# Patient Record
Sex: MALE | Race: ASIAN | NOT HISPANIC OR LATINO | URBAN - METROPOLITAN AREA
[De-identification: names, ages, dates, MRNs, and addresses within clinical notes are randomized per-mention and may not be internally consistent; named-entity substitution may affect disease eponyms.]

---

## 2024-10-09 ENCOUNTER — OUTPATIENT (OUTPATIENT)
Dept: OUTPATIENT SERVICES | Facility: HOSPITAL | Age: 69
LOS: 1 days | End: 2024-10-09

## 2024-10-09 VITALS
TEMPERATURE: 98 F | HEIGHT: 69.5 IN | SYSTOLIC BLOOD PRESSURE: 113 MMHG | RESPIRATION RATE: 16 BRPM | HEART RATE: 75 BPM | OXYGEN SATURATION: 97 % | DIASTOLIC BLOOD PRESSURE: 75 MMHG | WEIGHT: 192.02 LBS

## 2024-10-09 DIAGNOSIS — E21.0 PRIMARY HYPERPARATHYROIDISM: ICD-10-CM

## 2024-10-09 DIAGNOSIS — Z98.890 OTHER SPECIFIED POSTPROCEDURAL STATES: Chronic | ICD-10-CM

## 2024-10-09 DIAGNOSIS — Z95.5 PRESENCE OF CORONARY ANGIOPLASTY IMPLANT AND GRAFT: Chronic | ICD-10-CM

## 2024-10-09 DIAGNOSIS — I25.10 ATHEROSCLEROTIC HEART DISEASE OF NATIVE CORONARY ARTERY WITHOUT ANGINA PECTORIS: ICD-10-CM

## 2024-10-09 DIAGNOSIS — Z98.890 OTHER SPECIFIED POSTPROCEDURAL STATES: ICD-10-CM

## 2024-10-09 NOTE — H&P PST ADULT - NSICDXPASTMEDICALHX_GEN_ALL_CORE_FT
PAST MEDICAL HISTORY:  CAD (coronary artery disease)     Fatty liver     HLD (hyperlipidemia)     Primary hyperparathyroidism

## 2024-10-09 NOTE — H&P PST ADULT - PROBLEM SELECTOR PLAN 1
Pt is scheduled for parathyroidectomy with parathyroid hormone assay on 10/18/24.  Verbal and written pre op instructions reviewed with patient and pt able to verbalize understanding.   Verbal and written instructions given with chlorhexidine wash, pt able to verbalize understanding via teach back method.

## 2024-10-09 NOTE — H&P PST ADULT - NSANTHOSAYNRD_GEN_A_CORE
No. ESA screening performed.  STOP BANG Legend: 0-2 = LOW Risk; 3-4 = INTERMEDIATE Risk; 5-8 = HIGH Risk

## 2024-10-09 NOTE — H&P PST ADULT - ENMT COMMENTS
primary hyperparathyroidism Denies loose teeth or dentures.  Mallampati Denies loose teeth or dentures.  Mallampati I

## 2024-10-18 ENCOUNTER — OUTPATIENT (OUTPATIENT)
Dept: OUTPATIENT SERVICES | Facility: HOSPITAL | Age: 69
LOS: 1 days | Discharge: HOME | End: 2024-10-18
Payer: MEDICARE

## 2024-10-18 ENCOUNTER — TRANSCRIPTION ENCOUNTER (OUTPATIENT)
Age: 69
End: 2024-10-18

## 2024-10-18 ENCOUNTER — APPOINTMENT (OUTPATIENT)
Dept: SURGERY | Facility: HOSPITAL | Age: 69
End: 2024-10-18
Payer: MEDICARE

## 2024-10-18 ENCOUNTER — RESULT REVIEW (OUTPATIENT)
Age: 69
End: 2024-10-18

## 2024-10-18 VITALS
RESPIRATION RATE: 18 BRPM | WEIGHT: 192.02 LBS | SYSTOLIC BLOOD PRESSURE: 148 MMHG | HEIGHT: 69.5 IN | TEMPERATURE: 98 F | HEART RATE: 72 BPM | DIASTOLIC BLOOD PRESSURE: 89 MMHG | OXYGEN SATURATION: 100 %

## 2024-10-18 VITALS
RESPIRATION RATE: 15 BRPM | OXYGEN SATURATION: 96 % | DIASTOLIC BLOOD PRESSURE: 95 MMHG | HEART RATE: 75 BPM | SYSTOLIC BLOOD PRESSURE: 122 MMHG

## 2024-10-18 DIAGNOSIS — Z95.5 PRESENCE OF CORONARY ANGIOPLASTY IMPLANT AND GRAFT: Chronic | ICD-10-CM

## 2024-10-18 DIAGNOSIS — Z98.890 OTHER SPECIFIED POSTPROCEDURAL STATES: Chronic | ICD-10-CM

## 2024-10-18 DIAGNOSIS — E21.0 PRIMARY HYPERPARATHYROIDISM: ICD-10-CM

## 2024-10-18 LAB
PTH INTACT, INTRAOP SPECIMEN 2: SIGNIFICANT CHANGE UP
PTH INTACT, INTRAOP SPECIMEN 3: SIGNIFICANT CHANGE UP
PTH INTACT, INTRAOP SPECIMEN 4: SIGNIFICANT CHANGE UP
PTH INTACT, INTRAOP TIMING 2: SIGNIFICANT CHANGE UP
PTH INTACT, INTRAOP TIMING 3: SIGNIFICANT CHANGE UP
PTH INTACT, INTRAOP TIMING 4: SIGNIFICANT CHANGE UP
PTH INTACT, INTRAOPERATIVE 2: 91 PG/ML — HIGH (ref 15–65)
PTH INTACT, INTRAOPERATIVE 3: 37 PG/ML — SIGNIFICANT CHANGE UP (ref 15–65)
PTH INTACT, INTRAOPERATIVE 4: 29 PG/ML — SIGNIFICANT CHANGE UP (ref 15–65)
PTH-INTACT IO % DIF SERPL: 90 PG/ML — HIGH (ref 15–65)

## 2024-10-18 PROCEDURE — 88331 PATH CONSLTJ SURG 1 BLK 1SPC: CPT | Mod: 26

## 2024-10-18 PROCEDURE — 88305 TISSUE EXAM BY PATHOLOGIST: CPT | Mod: 26

## 2024-10-18 PROCEDURE — 60500 EXPLORE PARATHYROID GLANDS: CPT

## 2024-10-18 DEVICE — LIGATING CLIPS WECK HORIZON SMALL-WIDE (RED) 24: Type: IMPLANTABLE DEVICE | Status: FUNCTIONAL

## 2024-10-18 RX ORDER — MULTI VITAMIN/MINERAL SUPPLEMENT WITH ASCORBIC ACID, NIACIN, PYRIDOXINE, PANTOTHENIC ACID, FOLIC ACID, RIBOFLAVIN, THIAMIN, BIOTIN, COBALAMIN AND ZINC. 60; 20; 12.5; 10; 10; 1.7; 1.5; 1; .3; .006 MG/1; MG/1; MG/1; MG/1; MG/1; MG/1; MG/1; MG/1; MG/1; MG/1
1 TABLET, COATED ORAL
Refills: 0 | DISCHARGE

## 2024-10-18 RX ORDER — ANTACID TABLETS 500 MG/1
1 TABLET, CHEWABLE ORAL
Qty: 0 | Refills: 0 | DISCHARGE
Start: 2024-10-18

## 2024-10-18 RX ORDER — ONDANSETRON HCL/PF 4 MG/2 ML
4 VIAL (ML) INJECTION ONCE
Refills: 0 | Status: DISCONTINUED | OUTPATIENT
Start: 2024-10-18 | End: 2024-10-21

## 2024-10-18 RX ORDER — LIFITEGRAST 50 MG/ML
1 SOLUTION/ DROPS OPHTHALMIC
Refills: 0 | DISCHARGE

## 2024-10-18 RX ORDER — ATORVASTATIN CALCIUM 10 MG/1
1 TABLET, FILM COATED ORAL
Refills: 0 | DISCHARGE

## 2024-10-18 RX ORDER — BENZOCAINE AND LEVOMENTHOL 200; 5 MG/G; MG/G
1 SPRAY TOPICAL
Refills: 0 | Status: DISCONTINUED | OUTPATIENT
Start: 2024-10-18 | End: 2024-10-21

## 2024-10-18 RX ORDER — ASPIRIN 325 MG
1 TABLET ORAL
Refills: 0 | DISCHARGE

## 2024-10-18 RX ORDER — OXYCODONE HYDROCHLORIDE 30 MG/1
5 TABLET, FILM COATED, EXTENDED RELEASE ORAL ONCE
Refills: 0 | Status: DISCONTINUED | OUTPATIENT
Start: 2024-10-18 | End: 2024-10-21

## 2024-10-18 RX ORDER — ACETAMINOPHEN 325 MG
1000 TABLET ORAL EVERY 6 HOURS
Refills: 0 | Status: DISCONTINUED | OUTPATIENT
Start: 2024-10-18 | End: 2024-10-21

## 2024-10-18 RX ORDER — ANTACID TABLETS 500 MG/1
1 TABLET, CHEWABLE ORAL EVERY 6 HOURS
Refills: 0 | Status: DISCONTINUED | OUTPATIENT
Start: 2024-10-18 | End: 2024-10-21

## 2024-10-18 RX ORDER — SODIUM CHLORIDE IRRIG SOLUTION 0.9 %
1000 SOLUTION, IRRIGATION IRRIGATION
Refills: 0 | Status: DISCONTINUED | OUTPATIENT
Start: 2024-10-18 | End: 2024-10-21

## 2024-10-18 RX ORDER — FENTANYL CITRATE-0.9 % NACL/PF 300MCG/30
25 PATIENT CONTROLLED ANALGESIA VIAL INJECTION
Refills: 0 | Status: DISCONTINUED | OUTPATIENT
Start: 2024-10-18 | End: 2024-10-21

## 2024-10-18 RX ORDER — ACETAMINOPHEN 325 MG
2 TABLET ORAL
Qty: 0 | Refills: 0 | DISCHARGE
Start: 2024-10-18

## 2024-10-18 RX ADMIN — ANTACID TABLETS 1 TABLET(S): 500 TABLET, CHEWABLE ORAL at 14:12

## 2024-10-18 RX ADMIN — BENZOCAINE AND LEVOMENTHOL 1 LOZENGE: 200; 5 SPRAY TOPICAL at 14:12

## 2024-10-18 NOTE — ASU DISCHARGE PLAN (ADULT/PEDIATRIC) - FINANCIAL ASSISTANCE
[facility] provides services at a reduced cost to those who are determined to be eligible through [facility]’s financial assistance program. Information regarding [facility]’s financial assistance program can be found by going to [email or web address] or by calling [phone].

## 2024-10-18 NOTE — ASU DISCHARGE PLAN (ADULT/PEDIATRIC) - CARE PROVIDER_API CALL
Rashad Durham  Surgery  [address] [place] [address] [address]  [place] [address]  Phone: [phone]  Fax: [phone]  Follow Up Time:

## 2024-10-18 NOTE — BRIEF OPERATIVE NOTE - OPERATION/FINDINGS
skin incise with scalpel and tissue dissected with electrocautery. platysma and fascia divided. pth release assay performed at start of case, pre-excision, post-excision at intervals. right superior parathyroid gland was removed and found to be hypercellular by pathology. blood vessels clipped. platysma and fascia closed with chromic suture. skin closed with monocryl and dressed with steri strips.

## 2024-10-18 NOTE — ASU DISCHARGE PLAN (ADULT/PEDIATRIC) - NS MD DC FALL RISK RISK
For information on Fall & Injury Prevention, visit: [email or web address] OR  [email or web address] OR  https://www.cdc.gov/steadi/patient.html

## 2024-10-18 NOTE — ASU DISCHARGE PLAN (ADULT/PEDIATRIC) - NURSING INSTRUCTIONS
Last dose of TYLENOL for pain management was at 12PM. Next dose of TYLENOL may be taken at or after 6PM if needed. DO NOT take any additional products containing TYLENOL or ACETAMINOPHEN, such as VICODIN, PERCOCET, NORCO, EXCEDRIN, and any over-the-counter cold medications. DO NOT CONSUME MORE THAN 9922-9246 MG OF TYLENOL (acetaminophen) in a 24-hour period.

## 2024-10-23 LAB — SURGICAL PATHOLOGY STUDY: SIGNIFICANT CHANGE UP

## 2024-10-29 ENCOUNTER — APPOINTMENT (OUTPATIENT)
Dept: SURGERY | Facility: CLINIC | Age: 69
End: 2024-10-29
Payer: MEDICARE

## 2024-10-29 DIAGNOSIS — E21.3 HYPERPARATHYROIDISM, UNSPECIFIED: ICD-10-CM

## 2024-10-29 PROBLEM — K76.0 FATTY (CHANGE OF) LIVER, NOT ELSEWHERE CLASSIFIED: Chronic | Status: ACTIVE | Noted: 2024-10-09

## 2024-10-29 PROBLEM — E21.0 PRIMARY HYPERPARATHYROIDISM: Chronic | Status: ACTIVE | Noted: 2024-10-09

## 2024-10-29 PROBLEM — I25.10 ATHEROSCLEROTIC HEART DISEASE OF NATIVE CORONARY ARTERY WITHOUT ANGINA PECTORIS: Chronic | Status: ACTIVE | Noted: 2024-10-09

## 2024-10-29 PROBLEM — E78.5 HYPERLIPIDEMIA, UNSPECIFIED: Chronic | Status: ACTIVE | Noted: 2024-10-09

## 2024-10-29 PROCEDURE — 36415 COLL VENOUS BLD VENIPUNCTURE: CPT

## 2024-10-29 PROCEDURE — 99024 POSTOP FOLLOW-UP VISIT: CPT

## 2024-10-30 ENCOUNTER — NON-APPOINTMENT (OUTPATIENT)
Age: 69
End: 2024-10-30

## 2024-10-30 LAB
25(OH)D3 SERPL-MCNC: 42.3 NG/ML
CALCIUM SERPL-MCNC: 9.6 MG/DL
CALCIUM SERPL-MCNC: 9.6 MG/DL
PARATHYROID HORMONE INTACT: 22 PG/ML

## 2025-02-04 ENCOUNTER — APPOINTMENT (OUTPATIENT)
Dept: SURGERY | Facility: CLINIC | Age: 70
End: 2025-02-04
Payer: MEDICARE

## 2025-02-04 DIAGNOSIS — E21.3 HYPERPARATHYROIDISM, UNSPECIFIED: ICD-10-CM

## 2025-02-04 PROCEDURE — 99213 OFFICE O/P EST LOW 20 MIN: CPT

## 2025-02-04 PROCEDURE — 36415 COLL VENOUS BLD VENIPUNCTURE: CPT

## 2025-02-05 ENCOUNTER — NON-APPOINTMENT (OUTPATIENT)
Age: 70
End: 2025-02-05

## 2025-02-05 LAB
25(OH)D3 SERPL-MCNC: 37.9 NG/ML
CALCIUM SERPL-MCNC: 9.8 MG/DL
CALCIUM SERPL-MCNC: 9.8 MG/DL
PARATHYROID HORMONE INTACT: 24 PG/ML

## 2025-02-10 ENCOUNTER — TRANSCRIPTION ENCOUNTER (OUTPATIENT)
Age: 70
End: 2025-02-10

## 2025-02-10 ENCOUNTER — OUTPATIENT (OUTPATIENT)
Dept: OUTPATIENT SERVICES | Facility: HOSPITAL | Age: 70
LOS: 1 days | End: 2025-02-10
Payer: MEDICARE

## 2025-02-10 VITALS
TEMPERATURE: 98 F | OXYGEN SATURATION: 98 % | DIASTOLIC BLOOD PRESSURE: 87 MMHG | HEART RATE: 66 BPM | SYSTOLIC BLOOD PRESSURE: 139 MMHG | RESPIRATION RATE: 12 BRPM | HEIGHT: 69.5 IN | WEIGHT: 185.19 LBS

## 2025-02-10 VITALS
DIASTOLIC BLOOD PRESSURE: 80 MMHG | RESPIRATION RATE: 19 BRPM | SYSTOLIC BLOOD PRESSURE: 130 MMHG | HEART RATE: 64 BPM | OXYGEN SATURATION: 99 %

## 2025-02-10 DIAGNOSIS — Z98.890 OTHER SPECIFIED POSTPROCEDURAL STATES: Chronic | ICD-10-CM

## 2025-02-10 DIAGNOSIS — Z95.5 PRESENCE OF CORONARY ANGIOPLASTY IMPLANT AND GRAFT: Chronic | ICD-10-CM

## 2025-02-10 DIAGNOSIS — H35.371 PUCKERING OF MACULA, RIGHT EYE: ICD-10-CM

## 2025-02-10 PROCEDURE — 93010 ELECTROCARDIOGRAM REPORT: CPT

## 2025-02-10 PROCEDURE — 67042 VIT FOR MACULAR HOLE: CPT | Mod: AS,RT

## 2025-02-10 PROCEDURE — C1889: CPT

## 2025-02-10 PROCEDURE — 67042 VIT FOR MACULAR HOLE: CPT | Mod: RT

## 2025-02-10 PROCEDURE — 93005 ELECTROCARDIOGRAM TRACING: CPT

## 2025-02-10 DEVICE — GS C3F8 PERFLUOROPROPANE IOL 2.5 L 20GM
Type: IMPLANTABLE DEVICE | Site: RIGHT | Status: NON-FUNCTIONAL
Removed: 2025-02-10

## 2025-02-10 NOTE — ASU DISCHARGE PLAN (ADULT/PEDIATRIC) - FINANCIAL ASSISTANCE
[facility] provides services at a reduced cost to those who are determined to be eligible through [facility]’s financial assistance program. Information regarding [facility]’s financial assistance program can be found by going to [email or web address] or by calling [phone].

## 2025-02-10 NOTE — ASU PATIENT PROFILE, ADULT - NSICDXPASTMEDICALHX_GEN_ALL_CORE_FT
PAST MEDICAL HISTORY:  CAD (coronary artery disease)     Fatty liver     History of thyroid nodule     HLD (hyperlipidemia)     Primary hyperparathyroidism

## 2025-02-10 NOTE — ASU DISCHARGE PLAN (ADULT/PEDIATRIC) - ASU DC SPECIAL INSTRUCTIONSFT
Continue Home Medication Regimen as per your Primary Care Provider    Keep patch and shield on    Maintain Face down Positioning    Follow up in the Retina Surgeon's office tomorrow

## 2025-02-10 NOTE — ASU PATIENT PROFILE, ADULT - NSICDXPASTSURGICALHX_GEN_ALL_CORE_FT
PAST SURGICAL HISTORY:  History of mitral valve repair     History of parathyroid surgery     S/P coronary artery stent placement x 2 2018, 2019

## 2025-02-10 NOTE — ASU PREOP CHECKLIST - BMI (KG/M2)
- ECMO decannulated 12/08 after successful wean  - IABP in place, keep 1:1, target augmented MAP 70s, start nitro gtt today to achieve  - remains pulsatile  - cont argatroban for AC while on IABP  - will titrate GDMT as appropriate if above goal off pressors. 27 - ECMO decannulated 12/08 after successful wean  - IABP in place, keep 1:1, target augmented MAP 70s, start nitro gtt today to achieve target, plan for IABP wean tomorrow  - another dose of bumetanide 2 IV x1  - remains pulsatile  - cont argatroban for AC while on IABP  - will titrate GDMT as appropriate if above goal off pressors.

## 2025-02-10 NOTE — ASU DISCHARGE PLAN (ADULT/PEDIATRIC) - NS MD DC FALL RISK RISK
For information on Fall & Injury Prevention, visit: [email or web address] OR  [email or web address] OR  https://www.cdc.gov/steadi/patient.html

## 2025-02-10 NOTE — ASU DISCHARGE PLAN (ADULT/PEDIATRIC) - CLICK TO LAUNCH ORM
. Peng Advancement Flap Text: The defect edges were debeveled with a #15 scalpel blade.  Given the location of the defect, shape of the defect and the proximity to free margins a Peng advancement flap was deemed most appropriate.  Using a sterile surgical marker, an appropriate advancement flap was drawn incorporating the defect and placing the expected incisions within the relaxed skin tension lines where possible. The area thus outlined was incised deep to adipose tissue with a #15 scalpel blade.  The skin margins were undermined to an appropriate distance in all directions utilizing iris scissors.

## 2025-02-10 NOTE — ASU DISCHARGE PLAN (ADULT/PEDIATRIC) - CARE PROVIDER_API CALL
[Mother] : mother
Harvey Saleh)  Ophthalmology  [address] [place] [address], [address]  [place] [address]  Phone: [phone]  Fax: [phone]  Scheduled Appointment: 02/11/2025 10:45 AM

## 2025-02-10 NOTE — ASU PATIENT PROFILE, ADULT - FALL HARM RISK - HARM RISK INTERVENTIONS
Assistance with ambulation/Communicate Risk of Fall with Harm to all staff/Monitor for mental status changes/Monitor gait and stability/Reinforce activity limits and safety measures with patient and family/Tailored Fall Risk Interventions/Visual Cue: Yellow wristband and red socks/Bed in lowest position, wheels locked, appropriate side rails in place/Call bell, personal items and telephone in reach/Instruct patient to call for assistance before getting out of bed or chair/Non-slip footwear when patient is out of bed/[place] to call system/Physically safe environment - no spills, clutter or unnecessary equipment/Purposeful Proactive Rounding/Room/bathroom lighting operational, light cord in reach

## 2025-08-07 ENCOUNTER — APPOINTMENT (OUTPATIENT)
Dept: SURGERY | Facility: CLINIC | Age: 70
End: 2025-08-07
Payer: MEDICARE

## 2025-08-07 VITALS — HEIGHT: 68 IN | WEIGHT: 185 LBS | BODY MASS INDEX: 28.04 KG/M2

## 2025-08-07 DIAGNOSIS — E21.3 HYPERPARATHYROIDISM, UNSPECIFIED: ICD-10-CM

## 2025-08-07 PROCEDURE — 36415 COLL VENOUS BLD VENIPUNCTURE: CPT

## 2025-08-07 PROCEDURE — 99213 OFFICE O/P EST LOW 20 MIN: CPT

## 2025-08-08 ENCOUNTER — NON-APPOINTMENT (OUTPATIENT)
Age: 70
End: 2025-08-08

## 2025-08-08 LAB
25(OH)D3 SERPL-MCNC: 41 NG/ML
CALCIUM SERPL-MCNC: 10.1 MG/DL
CALCIUM SERPL-MCNC: 10.1 MG/DL
PARATHYROID HORMONE INTACT: 28 PG/ML

## (undated) DEVICE — BIPOLAR FORCEP CORD WECK STANDARD 12FT

## (undated) DEVICE — CANISTER DISPOSABLE THIN WALL 3000CC

## (undated) DEVICE — ILM FORCEP 23G

## (undated) DEVICE — GLV 7 PROTEXIS (WHITE)

## (undated) DEVICE — DRAPE LAPAROTOMY TRANSVERSE

## (undated) DEVICE — CANNULA ALCON SOFT TIP 25G

## (undated) DEVICE — CONSTELLATION TOTAL PLUS PAK 23G

## (undated) DEVICE — FLEXLOOP CURVED SCRAPER MEMBRANE 25G

## (undated) DEVICE — ELCTR GROUNDING PAD ADULT COVIDIEN

## (undated) DEVICE — WARMING BLANKET LOWER ADULT

## (undated) DEVICE — SUT VICRYL 7-0 12" TG140-8 DA

## (undated) DEVICE — DRAPE OPHTHALMIC W POUCH

## (undated) DEVICE — LABELS BLANK W PEN

## (undated) DEVICE — GLV 7.5 PROTEXIS (WHITE)

## (undated) DEVICE — DRAPE MICROSCOPE RESIGHT

## (undated) DEVICE — PACK HEAD & NECK

## (undated) DEVICE — SOL IRR BAL SALT + 500ML

## (undated) DEVICE — AUTO GAS FILL CONSTELLATION

## (undated) DEVICE — NDL HYPO SAFE 25G X 5/8" (ORANGE)

## (undated) DEVICE — SUT VICRYL PLUS 2-0 18" TIES UNDYED

## (undated) DEVICE — LENS VITRECTOMY FLAT

## (undated) DEVICE — VENODYNE/SCD SLEEVE CALF MEDIUM

## (undated) DEVICE — DRSG BENZOIN 0.6CC

## (undated) DEVICE — MARKER OPTH STR VISIMARK VIO

## (undated) DEVICE — ELCTR BIPOLAR CORD J&J 12FT DISP

## (undated) DEVICE — SYE-LASER - CONSTELLATION MACHINE #2 1003466901X: Type: DURABLE MEDICAL EQUIPMENT

## (undated) DEVICE — SUT VICRYL 3-0 18" TIES UNDYED

## (undated) DEVICE — PREP BETADINE KIT

## (undated) DEVICE — DIATHERMY PROBE 25GA

## (undated) DEVICE — SUT MONOCRYL 4-0 27" PS-2 UNDYED

## (undated) DEVICE — BACKFLUSH SOFT TIP 25G

## (undated) DEVICE — CANNULA ALCON SOFT TIP 23G

## (undated) DEVICE — SCRAPER MEMBRANE 23G

## (undated) DEVICE — PROTECTOR HEEL / ELBOW FLUFFY

## (undated) DEVICE — ELCTR BOVIE PENCIL SMOKE EVACUATION

## (undated) DEVICE — DRAPE 3/4 SHEET 52X76"

## (undated) DEVICE — SOL BALANCE SALT 15ML

## (undated) DEVICE — LIA-ESU FORCE FX T2E29332EX: Type: DURABLE MEDICAL EQUIPMENT

## (undated) DEVICE — PACK VITRECTOMY

## (undated) DEVICE — ILM FORCEP 25G

## (undated) DEVICE — SUT CHROMIC 3-0 27" SH

## (undated) DEVICE — SOL IRR POUR H2O 500ML

## (undated) DEVICE — PACK CONSTELLATION POST 25G 20K